# Patient Record
Sex: FEMALE | URBAN - METROPOLITAN AREA
[De-identification: names, ages, dates, MRNs, and addresses within clinical notes are randomized per-mention and may not be internally consistent; named-entity substitution may affect disease eponyms.]

---

## 2023-01-16 ENCOUNTER — APPOINTMENT (OUTPATIENT)
Dept: URBAN - METROPOLITAN AREA CLINIC 265 | Age: 43
Setting detail: DERMATOLOGY
End: 2023-01-27

## 2023-01-16 VITALS — HEIGHT: 67 IN | WEIGHT: 225 LBS | RESPIRATION RATE: 18 BRPM

## 2023-01-16 DIAGNOSIS — L21.8 OTHER SEBORRHEIC DERMATITIS: ICD-10-CM

## 2023-01-16 PROCEDURE — OTHER COUNSELING: OTHER

## 2023-01-16 PROCEDURE — 99203 OFFICE O/P NEW LOW 30 MIN: CPT

## 2023-01-16 PROCEDURE — OTHER PRESCRIPTION MEDICATION MANAGEMENT: OTHER

## 2023-01-16 PROCEDURE — OTHER PRESCRIPTION: OTHER

## 2023-01-16 PROCEDURE — OTHER MEDICATION COUNSELING: OTHER

## 2023-01-16 PROCEDURE — OTHER MIPS QUALITY: OTHER

## 2023-01-16 RX ORDER — DESONIDE 0.5 MG/G
CREAM TOPICAL BID
Qty: 60 | Refills: 3 | Status: ERX | COMMUNITY
Start: 2023-01-16

## 2023-01-16 RX ORDER — KETOCONAZOLE 20 MG/ML
SHAMPOO, SUSPENSION TOPICAL BIW
Qty: 120 | Refills: 5 | Status: ERX | COMMUNITY
Start: 2023-01-16

## 2023-01-16 ASSESSMENT — LOCATION DETAILED DESCRIPTION DERM
LOCATION DETAILED: POSTERIOR MID-PARIETAL SCALP
LOCATION DETAILED: SUPERIOR MID FOREHEAD

## 2023-01-16 ASSESSMENT — LOCATION SIMPLE DESCRIPTION DERM
LOCATION SIMPLE: POSTERIOR SCALP
LOCATION SIMPLE: SUPERIOR FOREHEAD

## 2023-01-16 ASSESSMENT — LOCATION ZONE DERM
LOCATION ZONE: FACE
LOCATION ZONE: SCALP

## 2023-01-16 NOTE — PROCEDURE: PRESCRIPTION MEDICATION MANAGEMENT
Continue Regimen: ketoconazole 2 % shampoo, lather into scalp and let it sit x 10-15 minutes, rinse. Use up to 3 times weekly. (patient already using)
Detail Level: Simple
Plan: RTC 6 weeks.
Render In Strict Bullet Format?: No
Initiate Treatment: desonide 0.05 % topical cream, Apply to affected areas of face twice a day x 7 days, stop x 1 week, repeat cycle as needed. Moisturize after.

## 2023-01-16 NOTE — PROCEDURE: MEDICATION COUNSELING
Zoryve Counseling:  I discussed with the patient that Zoryve is not for use in the eyes, mouth or vagina. The most commonly reported side effects include diarrhea, headache, insomnia, application site pain, upper respiratory tract infections, and urinary tract infections.  All of the patient's questions and concerns were addressed. none

## 2023-02-20 ENCOUNTER — APPOINTMENT (OUTPATIENT)
Dept: URBAN - METROPOLITAN AREA CLINIC 265 | Age: 43
Setting detail: DERMATOLOGY
End: 2023-02-21

## 2023-02-20 ENCOUNTER — RX ONLY (RX ONLY)
Age: 43
End: 2023-02-20

## 2023-02-20 VITALS — WEIGHT: 225 LBS | RESPIRATION RATE: 18 BRPM | HEIGHT: 67 IN

## 2023-02-20 DIAGNOSIS — L21.8 OTHER SEBORRHEIC DERMATITIS: ICD-10-CM

## 2023-02-20 PROCEDURE — 99213 OFFICE O/P EST LOW 20 MIN: CPT

## 2023-02-20 PROCEDURE — OTHER MEDICATION COUNSELING: OTHER

## 2023-02-20 PROCEDURE — OTHER PRESCRIPTION: OTHER

## 2023-02-20 PROCEDURE — OTHER MIPS QUALITY: OTHER

## 2023-02-20 PROCEDURE — OTHER COUNSELING: OTHER

## 2023-02-20 PROCEDURE — OTHER PRESCRIPTION MEDICATION MANAGEMENT: OTHER

## 2023-02-20 RX ORDER — FLUOCINOLONE ACETONIDE 0.11 MG/ML
OIL TOPICAL
Qty: 118.28 | Refills: 3 | Status: ERX | COMMUNITY
Start: 2023-02-20

## 2023-02-20 RX ORDER — CLOBETASOL PROPIONATE 0.5 MG/ML
SOLUTION TOPICAL
Qty: 50 | Refills: 3 | Status: CANCELLED | COMMUNITY
Start: 2023-02-20

## 2023-02-20 ASSESSMENT — LOCATION SIMPLE DESCRIPTION DERM
LOCATION SIMPLE: POSTERIOR SCALP
LOCATION SIMPLE: SUPERIOR FOREHEAD

## 2023-02-20 ASSESSMENT — LOCATION ZONE DERM
LOCATION ZONE: FACE
LOCATION ZONE: SCALP

## 2023-02-20 ASSESSMENT — LOCATION DETAILED DESCRIPTION DERM
LOCATION DETAILED: POSTERIOR MID-PARIETAL SCALP
LOCATION DETAILED: SUPERIOR MID FOREHEAD

## 2023-02-20 NOTE — PROCEDURE: PRESCRIPTION MEDICATION MANAGEMENT
Detail Level: Simple
Discontinue Regimen: use for flares if not controlled on fluocinolone: desonide 0.05 % topical cream, Apply to affected areas of face twice a day x 7 days, stop x 1 week, repeat cycle as needed
Initiate Treatment: fluocinolone oil, Apply a few drops to affected areas of scalp 2x daily x 14 days, stop x 7 days, repeat cycle as needed.
Continue Regimen: ketoconazole 2 % shampoo, lather into scalp and let it sit x 10-15 minutes, rinse. Use up to 3 times weekly. (patient already using)\\n\\n
Render In Strict Bullet Format?: No
Plan: RTC 8 weeks.

## 2023-02-20 NOTE — PROCEDURE: MEDICATION COUNSELING
Patient requests all Lab, Cardiology, and Radiology Results on their Discharge Instructions Topical Retinoid counseling:  Patient advised to apply a pea-sized amount only at bedtime and wait 30 minutes after washing their face before applying.  If too drying, patient may add a non-comedogenic moisturizer. The patient verbalized understanding of the proper use and possible adverse effects of retinoids.  All of the patient's questions and concerns were addressed.

## 2023-10-03 ENCOUNTER — APPOINTMENT (OUTPATIENT)
Dept: URBAN - METROPOLITAN AREA CLINIC 298 | Age: 43
Setting detail: DERMATOLOGY
End: 2023-10-04

## 2023-10-03 VITALS — HEIGHT: 55 IN | WEIGHT: 250 LBS | RESPIRATION RATE: 18 BRPM

## 2023-10-03 DIAGNOSIS — L21.8 OTHER SEBORRHEIC DERMATITIS: ICD-10-CM

## 2023-10-03 DIAGNOSIS — L30.9 DERMATITIS, UNSPECIFIED: ICD-10-CM

## 2023-10-03 PROCEDURE — 99213 OFFICE O/P EST LOW 20 MIN: CPT

## 2023-10-03 PROCEDURE — OTHER COUNSELING: OTHER

## 2023-10-03 PROCEDURE — OTHER PRESCRIPTION MEDICATION MANAGEMENT: OTHER

## 2023-10-03 PROCEDURE — OTHER PRESCRIPTION: OTHER

## 2023-10-03 RX ORDER — KETOCONAZOLE 20 MG/G
CREAM TOPICAL
Qty: 60 | Refills: 2 | Status: ERX | COMMUNITY
Start: 2023-10-03

## 2023-10-03 ASSESSMENT — LOCATION SIMPLE DESCRIPTION DERM
LOCATION SIMPLE: UPPER BACK
LOCATION SIMPLE: POSTERIOR SCALP

## 2023-10-03 ASSESSMENT — LOCATION ZONE DERM
LOCATION ZONE: TRUNK
LOCATION ZONE: SCALP

## 2023-10-03 ASSESSMENT — LOCATION DETAILED DESCRIPTION DERM
LOCATION DETAILED: INFERIOR THORACIC SPINE
LOCATION DETAILED: POSTERIOR MID-PARIETAL SCALP

## 2023-10-03 NOTE — PROCEDURE: PRESCRIPTION MEDICATION MANAGEMENT
Initiate Treatment: ketoconazole 2 % topical cream \\nQuantity: 60.0 g  Days Supply: 30\\nSig: apply to affected areas twice a day until resolved. Repeat as needed
Render In Strict Bullet Format?: No
Detail Level: Zone
Plan: Return to clinic in 4 weeks.
Continue Regimen: keto shampoo as directed
Initiate Treatment: keto cream, apply BID

## 2023-11-02 ENCOUNTER — APPOINTMENT (OUTPATIENT)
Dept: URBAN - METROPOLITAN AREA CLINIC 298 | Age: 43
Setting detail: DERMATOLOGY
End: 2023-11-02

## 2023-11-02 VITALS — HEIGHT: 55 IN | WEIGHT: 250 LBS | RESPIRATION RATE: 18 BRPM

## 2023-11-02 DIAGNOSIS — L30.9 DERMATITIS, UNSPECIFIED: ICD-10-CM

## 2023-11-02 DIAGNOSIS — L21.8 OTHER SEBORRHEIC DERMATITIS: ICD-10-CM

## 2023-11-02 PROCEDURE — OTHER PRESCRIPTION MEDICATION MANAGEMENT: OTHER

## 2023-11-02 PROCEDURE — 99213 OFFICE O/P EST LOW 20 MIN: CPT

## 2023-11-02 PROCEDURE — OTHER MIPS QUALITY: OTHER

## 2023-11-02 PROCEDURE — OTHER COUNSELING: OTHER

## 2023-11-02 ASSESSMENT — LOCATION ZONE DERM
LOCATION ZONE: TRUNK
LOCATION ZONE: SCALP

## 2023-11-02 ASSESSMENT — LOCATION DETAILED DESCRIPTION DERM
LOCATION DETAILED: POSTERIOR MID-PARIETAL SCALP
LOCATION DETAILED: INFERIOR THORACIC SPINE

## 2023-11-02 ASSESSMENT — LOCATION SIMPLE DESCRIPTION DERM
LOCATION SIMPLE: POSTERIOR SCALP
LOCATION SIMPLE: UPPER BACK

## 2023-11-02 NOTE — PROCEDURE: COUNSELING
Detail Level: Simple
Patient Specific Counseling (Will Not Stick From Patient To Patient): advised PIH will take some time to resolve
Detail Level: Zone

## 2023-11-02 NOTE — PROCEDURE: PRESCRIPTION MEDICATION MANAGEMENT
Continue Regimen: ketoconazole 2 % topical cream, apply to affected areas twice a day until resolved. Repeat as needed
Render In Strict Bullet Format?: No
Detail Level: Zone
Plan: Return to clinic in 10 weeks.\\narea is improved, still visible PIH. advised this might take several months to resolve.
Continue Regimen: keto shampoo as directed, keto cream, apply BID
Plan: Return to clinic in 10 weeks.\\npt states some sensitivity when she applies keto cream. i advised using as light a moisturizer as she can tolerate prior to applying cream
Samples Given: Cerave moisturizing creams.

## 2023-12-08 NOTE — PROCEDURE: MEDICATION COUNSELING
Daily Note     Today's date: 2023  Patient name: Ny Maxwell  : 1984  MRN: [id number]  Referring provider: Jose Luis Dixon,*  Dx:   Encounter Diagnosis     ICD-10-CM    1. S/P left knee surgery  Z98.890       2. Patellar instability of left knee  M25.362       3. Recurrent dislocation of left patella  M22.02           Start Time: 1115  Stop Time: 1220  Total time in clinic (min): 65 minutes    Subjective: Patient reports having f/u with surgeon since last visit, notes surgeon is concerned about knee flexion ROM and is considering an KYA if not improved by next visit. Pt reports she is out of work for another 2 weeks. Pt notes ambulating at home without brace to attempt to use L LE muscles more, but does feel like she limps. Pt reports working on exercises a few times a day. Objective: See treatment diary below    Precautions: follow protocol       Manuals        Light patellar mobility  5 min 5 min 5 min 5 min 5 min                                              Neuro Re-Ed             Quad set issued 5"x10 5" 10x 5"x12 5" 10x 5"x10 blue roll, 5"x10 no roll       Weight shifting issued            Retro rocking      5"x10                                                           Ther Ex             Recumbent bike  5 min AROM 7 min AROM 8 min AROM 8 min AROM 8 min AROM       Heel slide flexion issued x10 20x with PT x20 w/ PT 20x with PT x20 w/ PT       SAQ  5"x5 min A 5" 10x with PT 5"x10 w/ PT 5" 10x with PT 5"x10 w/ PT       Ankle pump issued            LLLD knee extension stretch issued 4 min 5 min 5 min HEP        S/l hip abd    5"x10 5" 10x L 5"x10 L                                 Ther Activity                                       Gait Training                                       Modalities             Ice PRN  np 10 min np 10 min 10 min                      Assessment: Tolerated treatment well.   Knee flex ROM improves following bike AROM and manual therapy. Pt cont to present with significant quad strength limitations and requires assistance to perform SAQ. Trial of retro rocking this visit to improve quad activation and was issued updated HEP. Discussed utilizing unlocked brace for ambulation with focus on normalizing gait pattern vs antalgic gait without. Also discussed HEP and ice utilization for symptom control as patient reports she hasn't been using recently. Plan to RE next visit. Patient demonstrated fatigue post treatment, exhibited good technique with therapeutic exercises, and would benefit from continued PT. Plan: Progress treament per protocol. Adbry Pregnancy And Lactation Text: It is unknown if this medication will adversely affect pregnancy or breast feeding.

## 2024-01-11 ENCOUNTER — APPOINTMENT (OUTPATIENT)
Dept: URBAN - METROPOLITAN AREA CLINIC 298 | Age: 44
Setting detail: DERMATOLOGY
End: 2024-01-11

## 2024-01-11 VITALS — WEIGHT: 250 LBS | HEIGHT: 67 IN

## 2024-01-11 DIAGNOSIS — L21.8 OTHER SEBORRHEIC DERMATITIS: ICD-10-CM

## 2024-01-11 DIAGNOSIS — L30.9 DERMATITIS, UNSPECIFIED: ICD-10-CM

## 2024-01-11 PROCEDURE — OTHER MIPS QUALITY: OTHER

## 2024-01-11 PROCEDURE — OTHER PRESCRIPTION MEDICATION MANAGEMENT: OTHER

## 2024-01-11 PROCEDURE — 99213 OFFICE O/P EST LOW 20 MIN: CPT

## 2024-01-11 PROCEDURE — OTHER COUNSELING: OTHER

## 2024-01-11 ASSESSMENT — LOCATION DETAILED DESCRIPTION DERM
LOCATION DETAILED: POSTERIOR MID-PARIETAL SCALP
LOCATION DETAILED: INFERIOR THORACIC SPINE

## 2024-01-11 ASSESSMENT — LOCATION SIMPLE DESCRIPTION DERM
LOCATION SIMPLE: POSTERIOR SCALP
LOCATION SIMPLE: UPPER BACK

## 2024-01-11 ASSESSMENT — LOCATION ZONE DERM
LOCATION ZONE: SCALP
LOCATION ZONE: TRUNK

## 2024-01-11 NOTE — PROCEDURE: PRESCRIPTION MEDICATION MANAGEMENT
Discontinue Regimen: Ketoconazole cream
Render In Strict Bullet Format?: No
Detail Level: Zone
Continue Regimen: Ketoconazole shampoo, lather into scalp and let sit x 10-15 minutes, rinse, use up to 3x a week \\nKetoconazole cream, apply to affected areas twice a day until resolved, then 1 week past clearance
Plan: Return to clinic in 7 months\\npatient has noted improvement in sensitivity. She is happy with progress.

## 2024-01-11 NOTE — PROCEDURE: COUNSELING
Detail Level: Simple
Patient Specific Counseling (Will Not Stick From Patient To Patient): PIH is almost completely resolved
Detail Level: Zone

## 2024-09-05 NOTE — PROCEDURE: MEDICATION COUNSELING
Detail Level: Zone Detail Level: Generalized Detail Level: Detailed Eucrisa Counseling: Patient may experience a mild burning sensation during topical application. Eucrisa is not approved in children less than 2 years of age.

## 2025-03-24 NOTE — PROCEDURE: MEDICATION COUNSELING
Attending Attestation (For Attendings USE Only)... Minocycline Counseling: Patient advised regarding possible photosensitivity and discoloration of the teeth, skin, lips, tongue and gums.  Patient instructed to avoid sunlight, if possible.  When exposed to sunlight, patients should wear protective clothing, sunglasses, and sunscreen.  The patient was instructed to call the office immediately if the following severe adverse effects occur:  hearing changes, easy bruising/bleeding, severe headache, or vision changes.  The patient verbalized understanding of the proper use and possible adverse effects of minocycline.  All of the patient's questions and concerns were addressed.